# Patient Record
Sex: FEMALE | Race: WHITE | NOT HISPANIC OR LATINO | Employment: FULL TIME | URBAN - METROPOLITAN AREA
[De-identification: names, ages, dates, MRNs, and addresses within clinical notes are randomized per-mention and may not be internally consistent; named-entity substitution may affect disease eponyms.]

---

## 2017-02-06 ENCOUNTER — TELEPHONE (OUTPATIENT)
Dept: OTOLARYNGOLOGY | Facility: CLINIC | Age: 37
End: 2017-02-06

## 2017-02-06 NOTE — TELEPHONE ENCOUNTER
----- Message from Natalya Leigh sent at 2/6/2017 11:55 AM CST -----  Contact: [phone]  Please call above patient need a refill on medication thanks

## 2017-05-30 ENCOUNTER — TELEPHONE (OUTPATIENT)
Dept: OTOLARYNGOLOGY | Facility: CLINIC | Age: 37
End: 2017-05-30

## 2017-05-30 NOTE — TELEPHONE ENCOUNTER
----- Message from Kell Evans sent at 5/30/2017 11:17 AM CDT -----  Call patient about getting refill. Call

## 2017-09-21 ENCOUNTER — DOCUMENTATION ONLY (OUTPATIENT)
Dept: OTOLARYNGOLOGY | Facility: CLINIC | Age: 37
End: 2017-09-21

## 2018-02-22 ENCOUNTER — TELEPHONE (OUTPATIENT)
Dept: OTOLARYNGOLOGY | Facility: CLINIC | Age: 38
End: 2018-02-22

## 2019-01-15 ENCOUNTER — TELEPHONE (OUTPATIENT)
Dept: OTOLARYNGOLOGY | Facility: CLINIC | Age: 39
End: 2019-01-15

## 2019-01-15 NOTE — TELEPHONE ENCOUNTER
----- Message from Jayme Holliday MD sent at 1/15/2019  1:00 PM CST -----  Contact: [facility]  OK to refill.    Can't do in Epic.      ----- Message -----  From: Fernanda Rangel MA  Sent: 1/15/2019  11:02 AM  To: Jayme Holliday MD        ----- Message -----  From: Kenyatta Callejas  Sent: 1/15/2019  10:53 AM  To: [facility] Staff    Pharmacy Calling    Reason for call: refill request    Pharmacy Name: [facility] - [address]      Prescription Name: Betahistine dihydrochloride 8 mg tablets    Phone Number: [phone] (Phone) [phone] (Fax)

## 2019-05-14 ENCOUNTER — TELEPHONE (OUTPATIENT)
Dept: OTOLARYNGOLOGY | Facility: CLINIC | Age: 39
End: 2019-05-14

## 2019-05-14 NOTE — TELEPHONE ENCOUNTER
Per Dr. Mg macias to call in script for Betahistine 8mg 1 capsule by mouth twice day #60 with 6 refills to [facility]. [facility]

## 2020-01-23 ENCOUNTER — TELEPHONE (OUTPATIENT)
Dept: OTOLARYNGOLOGY | Facility: CLINIC | Age: 40
End: 2020-01-23

## 2021-02-18 ENCOUNTER — TELEPHONE (OUTPATIENT)
Dept: OTOLARYNGOLOGY | Facility: CLINIC | Age: 41
End: 2021-02-18

## 2021-03-10 ENCOUNTER — CLINICAL SUPPORT (OUTPATIENT)
Dept: AUDIOLOGY | Facility: CLINIC | Age: 41
End: 2021-03-10
Payer: COMMERCIAL

## 2021-03-10 ENCOUNTER — OFFICE VISIT (OUTPATIENT)
Dept: OTOLARYNGOLOGY | Facility: CLINIC | Age: 41
End: 2021-03-10
Payer: COMMERCIAL

## 2021-03-10 VITALS — WEIGHT: 88.88 LBS | BODY MASS INDEX: 16.79 KG/M2

## 2021-03-10 DIAGNOSIS — H81.01 MENIERE'S DISEASE OF RIGHT EAR: ICD-10-CM

## 2021-03-10 DIAGNOSIS — H81.8X9 OTHER DISORDERS OF VESTIBULAR FUNCTION, UNSPECIFIED EAR: Primary | ICD-10-CM

## 2021-03-10 DIAGNOSIS — H90.41 SENSORINEURAL HEARING LOSS (SNHL) OF RIGHT EAR WITH UNRESTRICTED HEARING OF LEFT EAR: Primary | ICD-10-CM

## 2021-03-10 DIAGNOSIS — R42 DIZZINESS: Primary | ICD-10-CM

## 2021-03-10 DIAGNOSIS — R42 MIGRAINOUS DIZZINESS: ICD-10-CM

## 2021-03-10 PROCEDURE — 99999 PR PBB SHADOW E&M-EST. PATIENT-LVL II: ICD-10-PCS | Mod: PBBFAC,,, | Performed by: OTOLARYNGOLOGY

## 2021-03-10 PROCEDURE — 3008F PR BODY MASS INDEX (BMI) DOCUMENTED: ICD-10-PCS | Mod: CPTII,S$GLB,, | Performed by: OTOLARYNGOLOGY

## 2021-03-10 PROCEDURE — 92557 COMPREHENSIVE HEARING TEST: CPT | Mod: S$GLB,,, | Performed by: AUDIOLOGIST

## 2021-03-10 PROCEDURE — 1126F PR PAIN SEVERITY QUANTIFIED, NO PAIN PRESENT: ICD-10-PCS | Mod: S$GLB,,, | Performed by: OTOLARYNGOLOGY

## 2021-03-10 PROCEDURE — 99999 PR PBB SHADOW E&M-EST. PATIENT-LVL II: CPT | Mod: PBBFAC,,, | Performed by: OTOLARYNGOLOGY

## 2021-03-10 PROCEDURE — 92567 TYMPANOMETRY: CPT | Mod: S$GLB,,, | Performed by: AUDIOLOGIST

## 2021-03-10 PROCEDURE — 1126F AMNT PAIN NOTED NONE PRSNT: CPT | Mod: S$GLB,,, | Performed by: OTOLARYNGOLOGY

## 2021-03-10 PROCEDURE — 92537 PR CALORIC VSTBLR TEST W/REC BITHERMAL: ICD-10-PCS | Mod: S$GLB,,, | Performed by: AUDIOLOGIST

## 2021-03-10 PROCEDURE — 3008F BODY MASS INDEX DOCD: CPT | Mod: CPTII,S$GLB,, | Performed by: OTOLARYNGOLOGY

## 2021-03-10 PROCEDURE — 99205 OFFICE O/P NEW HI 60 MIN: CPT | Mod: S$GLB,,, | Performed by: OTOLARYNGOLOGY

## 2021-03-10 PROCEDURE — 99205 PR OFFICE/OUTPT VISIT, NEW, LEVL V, 60-74 MIN: ICD-10-PCS | Mod: S$GLB,,, | Performed by: OTOLARYNGOLOGY

## 2021-03-10 PROCEDURE — 92557 PR COMPREHENSIVE HEARING TEST: ICD-10-PCS | Mod: S$GLB,,, | Performed by: AUDIOLOGIST

## 2021-03-10 PROCEDURE — 92537 CALORIC VSTBLR TEST W/REC: CPT | Mod: S$GLB,,, | Performed by: AUDIOLOGIST

## 2021-03-10 PROCEDURE — 92540 BASIC VESTIBULAR EVALUATION: CPT | Mod: S$GLB,,, | Performed by: AUDIOLOGIST

## 2021-03-10 PROCEDURE — 92567 PR TYMPA2METRY: ICD-10-PCS | Mod: S$GLB,,, | Performed by: AUDIOLOGIST

## 2021-03-10 PROCEDURE — 92540 PR VESTIBULAR EVAL NYSTAG FOVL&PERPH STIM OSCIL TRACKING: ICD-10-PCS | Mod: S$GLB,,, | Performed by: AUDIOLOGIST

## 2021-03-10 RX ORDER — MONTELUKAST SODIUM 10 MG/1
10 TABLET ORAL DAILY
COMMUNITY
Start: 2021-02-01

## 2023-06-27 DIAGNOSIS — M22.2X9 PATELLOFEMORAL DISORDER: Primary | ICD-10-CM

## 2023-07-18 ENCOUNTER — HOSPITAL ENCOUNTER (OUTPATIENT)
Dept: RADIOLOGY | Facility: CLINIC | Age: 43
Discharge: HOME | End: 2023-07-18
Attending: SPECIALIST
Payer: COMMERCIAL

## 2023-07-18 ENCOUNTER — OFFICE VISIT (OUTPATIENT)
Dept: ORTHOPEDICS | Facility: CLINIC | Age: 43
End: 2023-07-18
Payer: COMMERCIAL

## 2023-07-18 VITALS — BODY MASS INDEX: 16.79 KG/M2 | HEIGHT: 61 IN

## 2023-07-18 DIAGNOSIS — M22.2X9 PATELLOFEMORAL DISORDER: ICD-10-CM

## 2023-07-18 DIAGNOSIS — S83.012A LATERAL SUBLUXATION OF LEFT PATELLA, INITIAL ENCOUNTER: Primary | ICD-10-CM

## 2023-07-18 DIAGNOSIS — S83.005A PATELLAR DISLOCATION, LEFT, INITIAL ENCOUNTER: ICD-10-CM

## 2023-07-18 PROCEDURE — 1160F RVW MEDS BY RX/DR IN RCRD: CPT | Mod: CPTII,,, | Performed by: SPECIALIST

## 2023-07-18 PROCEDURE — 1160F PR REVIEW ALL MEDS BY PRESCRIBER/CLIN PHARMACIST DOCUMENTED: ICD-10-PCS | Mod: CPTII,,, | Performed by: SPECIALIST

## 2023-07-18 PROCEDURE — 99203 OFFICE O/P NEW LOW 30 MIN: CPT | Mod: ,,, | Performed by: SPECIALIST

## 2023-07-18 PROCEDURE — 73564 XR KNEE COMP 4 OR MORE VIEWS LEFT: ICD-10-PCS | Mod: LT,,, | Performed by: SPECIALIST

## 2023-07-18 PROCEDURE — 3008F PR BODY MASS INDEX (BMI) DOCUMENTED: ICD-10-PCS | Mod: CPTII,,, | Performed by: SPECIALIST

## 2023-07-18 PROCEDURE — 99203 PR OFFICE/OUTPT VISIT, NEW, LEVL III, 30-44 MIN: ICD-10-PCS | Mod: ,,, | Performed by: SPECIALIST

## 2023-07-18 PROCEDURE — 73564 X-RAY EXAM KNEE 4 OR MORE: CPT | Mod: LT,,, | Performed by: SPECIALIST

## 2023-07-18 PROCEDURE — 1159F MED LIST DOCD IN RCRD: CPT | Mod: CPTII,,, | Performed by: SPECIALIST

## 2023-07-18 PROCEDURE — 1159F PR MEDICATION LIST DOCUMENTED IN MEDICAL RECORD: ICD-10-PCS | Mod: CPTII,,, | Performed by: SPECIALIST

## 2023-07-18 PROCEDURE — 3008F BODY MASS INDEX DOCD: CPT | Mod: CPTII,,, | Performed by: SPECIALIST

## 2023-07-18 RX ORDER — DEXTROAMPHETAMINE SACCHARATE, AMPHETAMINE ASPARTATE MONOHYDRATE, DEXTROAMPHETAMINE SULFATE AND AMPHETAMINE SULFATE 6.25; 6.25; 6.25; 6.25 MG/1; MG/1; MG/1; MG/1
CAPSULE, EXTENDED RELEASE ORAL EVERY MORNING
COMMUNITY
Start: 2023-06-12

## 2023-07-18 RX ORDER — FOLIC ACID 1 MG/1
1 TABLET ORAL EVERY MORNING
COMMUNITY

## 2023-07-18 RX ORDER — DOCUSATE SODIUM 100 MG/1
100 CAPSULE, LIQUID FILLED ORAL
COMMUNITY

## 2023-07-18 RX ORDER — ALPRAZOLAM 0.25 MG/1
0.25 TABLET ORAL
COMMUNITY
Start: 2023-05-01

## 2023-07-18 RX ORDER — AZELAIC ACID 0.15 G/G
GEL TOPICAL
COMMUNITY

## 2023-07-18 RX ORDER — CALCITONIN SALMON 200 [IU]/.09ML
SPRAY, METERED NASAL
COMMUNITY
Start: 2023-04-21

## 2023-07-18 RX ORDER — AMOXICILLIN 500 MG
2 CAPSULE ORAL
COMMUNITY

## 2023-07-18 RX ORDER — ERGOCALCIFEROL 1.25 MG/1
50000 CAPSULE ORAL
COMMUNITY

## 2023-07-18 RX ORDER — MAGNESIUM 30 MG
30 TABLET ORAL
COMMUNITY

## 2023-07-18 RX ORDER — ONDANSETRON 4 MG/1
TABLET, ORALLY DISINTEGRATING ORAL
COMMUNITY

## 2023-07-18 RX ORDER — HEPATITIS B VACCINE (RECOMBINANT) 20 UG/ML
INJECTION, SUSPENSION INTRAMUSCULAR
COMMUNITY

## 2023-07-18 RX ORDER — ASCORBIC ACID 500 MG
1 TABLET ORAL EVERY MORNING
COMMUNITY

## 2023-07-18 NOTE — PROGRESS NOTES
Chief Complaint:   Chief Complaint   Patient presents with    Pain     Left patellofemoral disorder, tried to get out of her recliner and her knee locked up and she fell in her living room, doesnt feel like it has stability no prior SX         History of present illness:    This is a 43 y.o. year old female who complains of left Knee symptoms ::knee gives way  Knee joint pain on the left  Worse with weightbearing  not Worse in the morning   Slowly worsens with extended activity  Is increased by bending it  By twisting it  By kneeling  With stairs  By squatting   not Knee joint swelling on the left posteriorly  Medially  Laterally °  Knee joint pain not improved by rest ° Not by ice ° No clicking sensation in the left knee ° No grating sensation in the left knee   ° The knee did  suddenly buckle ° No popping sound was heard in the knee   ° No tingling ° No burning sensation         Past Medical History:   Diagnosis Date    Cancer     Brain x 6 years ago    Dizziness     Hearing loss     Hyperlipidemia     Osteopenia        Past Surgical History:   Procedure Laterality Date    BRAIN SURGERY  02/06/2006    removal of cancer       Current Outpatient Medications   Medication Instructions    ALPRAZolam (XANAX) 0.25 mg, Oral    ascorbic acid, vitamin C, (VITAMIN C) 500 MG tablet 1 tablet, Oral, Every morning    atorvastatin (LIPITOR) 20 mg, Oral, Daily    azelaic acid (AZELEX) 15 % gel APPLY TO THE FACE EVERY MORNING.    betahistine HCl (BETAHISTINE, BULK, MISC) No dose, route, or frequency recorded.    BROMPHENIRA/PSEUDOEPHED/CODEIN (MAR-COF BP ORAL) Oral    calcitonin, salmon, (FORTICAL) 200 unit/actuation nasal spray SMARTSIG:Both Nares    dextroamphetamine-amphetamine (ADDERALL XR) 25 MG 24 hr capsule Oral, Every morning    docusate sodium (COLACE) 100 mg, Oral    ergocalciferol (ERGOCALCIFEROL) 50,000 Units, Oral    folic acid (FOLVITE) 1 MG tablet 1 tablet, Oral, Every morning    hepatitis B virus vacc.rec,PF,  "(ENGERIX-B, PF,) 20 mcg/mL Susp No dose, route, or frequency recorded.    linaCLOtide (LINZESS) 145 mcg, Oral, Daily    magnesium 30 mg, Oral    montelukast (SINGULAIR) 10 mg, Oral, Daily    omega-3 fatty acids/fish oil (FISH OIL-OMEGA-3 FATTY ACIDS) 300-1,000 mg capsule 2 g, Oral    ondansetron (ZOFRAN-ODT) 4 MG TbDL DISSOLVE 1 TABLET ON THE TONGUE EVERY 8 HOURS FOR UP TO 7 DAYS AS NEEDED    PSEUDOEPHEDRINE/BROMPHENIRAMIN (LODRANE D ORAL) Oral        Review of patient's allergies indicates:   Allergen Reactions    Mold     Pollens extract        Family History   Problem Relation Age of Onset    Hyperlipidemia Mother     Hypertension Mother     Hyperlipidemia Father     Cancer Maternal Aunt         Breast cancer    Diabetes Paternal Aunt         Type 1    Diabetes Paternal Uncle         Type 2    Heart failure Paternal Grandmother     Heart failure Paternal Grandfather     Stroke Paternal Grandfather     Diabetes Paternal Grandfather         Type 2    Cancer Maternal Aunt         Rectal cancer    Cancer Paternal Aunt         Lung cancer           Review of Systems:    Constitution:   Denies chills, fever, and sweats.  HENT:   Denies headaches or blurry vision.  Cardiovascular:  Denies chest pain or irregular heart beat.  Respiratory:   Denies cough or shortness of breath.  Gastrointestinal:  Denies abdominal pain, nausea, or vomiting.  Musculoskeletal:   Denies muscle cramps.  Neurological:   Denies dizziness or focal weakness.  Psychiatric/Behavior: Normal mental status.  Hematology/Lymph:  Denies bleeding problem or easy bruising/bleeding.  Skin:    Denies rash or suspicious lesions.    Examination:    Vital Signs:    Vitals:    07/18/23 1003   Height: 5' 1" (1.549 m)       Body mass index is 16.79 kg/m².    Constitution:   Well-developed, well nourished patient in no acute distress.  Neurological:   Alert and oriented x 3 and cooperative to examination.     Psychiatric/Behavior: Normal mental " status.  Respiratory:   No shortness of breath.  Eyes:    Extraoccular muscles intact  Skin:    No scars, rash or suspicious lesions.    Musculoskeletal Exam :   PHYSICAL FINDINGS  Cardiovascular:  Arterial Pulses: ° Dorsalis pedis pulses were normal right. ° Dorsalis pedis pulses were normal left.  Musculoskeletal System:  Hips:  General/bilateral: ° No swelling of the hips. ° No induration of the hips.  Right Hip: ° Motion was normal. ° No pain was elicited by active internal rotation with the hip flexed.  ° No pain was elicited by active external rotation with the hip flexed. ° No pain was elicited by active motion. ° No pain was elicited by passive motion.  Left Hip: ° Motion was normal.     Left Knee: ° Examined. ° No effusion. ° no localized swelling. °Genu varum. °Patella demonstrated crepitus. °Anteromedial aspect was tender on palpation. °Medial aspect was tender on palpation.  Positive apprehension test  Lateral tilt of the patella  Positive subluxation of patella  Increased Q angle    patella  Left Knee:  Knee Motion: Value   Active flexion     130 degrees  Active extension     0 degrees    left knee ° Pain was elicited throughout the range of motion. ° Swelling with a negative fluctuation test. ° No erythema. ° No warmth. ° Anterior aspect was not tender on palpation. ° Patellofemoral region was not tender on palpation. ° Medial collateral ligament was not tender on palpation. ° Lateral collateral ligament was not tender on palpation. ° No pain was elicited by motion using [place]'s apprehension test. ° No laxity of the posterior cruciate ligament. ° No anterior drawer sign was present. ° No one plane medial (straight) instability. ° No one plane lateral (straight) instability. ° A Lachman test did not demonstrate one plane anterior instability. ° Salvador's sign was observed for chondromalacia.        X-rays: left knee 4 views taken today   No acute bony abnormalities     Assessment: Lateral subluxation  of left patella, initial encounter    Patellofemoral disorder  -     X-Ray Knee Complete 4 or More Views Left; Future; Expected date: 07/18/2023  -     Ambulatory referral/consult to Orthopedics    Patellar dislocation, left, initial encounter        Plan:  shields brace   Quad strengthening  She may require PFL reconstruction with tibial tubercle transfer if her patella keep coming out        DISCLAIMER: This note may have been dictated using voice recognition software and may contain grammatical errors.     NOTE: Consult report sent to referring provider via [employer] EMR.